# Patient Record
Sex: FEMALE | Race: WHITE | NOT HISPANIC OR LATINO | URBAN - METROPOLITAN AREA
[De-identification: names, ages, dates, MRNs, and addresses within clinical notes are randomized per-mention and may not be internally consistent; named-entity substitution may affect disease eponyms.]

---

## 2023-06-12 ENCOUNTER — OFFICE VISIT (OUTPATIENT)
Dept: URGENT CARE | Facility: CLINIC | Age: 33
End: 2023-06-12
Payer: COMMERCIAL

## 2023-06-12 VITALS
DIASTOLIC BLOOD PRESSURE: 66 MMHG | OXYGEN SATURATION: 96 % | HEART RATE: 97 BPM | RESPIRATION RATE: 17 BRPM | HEIGHT: 62 IN | SYSTOLIC BLOOD PRESSURE: 99 MMHG | TEMPERATURE: 97.6 F | WEIGHT: 160 LBS | BODY MASS INDEX: 29.44 KG/M2

## 2023-06-12 DIAGNOSIS — J02.9 SORE THROAT: Primary | ICD-10-CM

## 2023-06-12 LAB — S PYO AG THROAT QL: NEGATIVE

## 2023-06-12 PROCEDURE — 87070 CULTURE OTHR SPECIMN AEROBIC: CPT

## 2023-06-12 PROCEDURE — 99283 EMERGENCY DEPT VISIT LOW MDM: CPT

## 2023-06-12 PROCEDURE — G0382 LEV 3 HOSP TYPE B ED VISIT: HCPCS

## 2023-06-12 PROCEDURE — 87880 STREP A ASSAY W/OPTIC: CPT

## 2023-06-12 NOTE — PROGRESS NOTES
"  [facility]        NAME: Pankaj Page is a 28 y o  female  : 1990    MRN: [id number]  DATE: 2023  TIME: 9:05 AM    Assessment and Plan   Sore throat [J02 9]  1  Sore throat  POCT rapid strepA    Throat culture            Patient Instructions     Take over the counter cough and cold medicine  Use a warm mist humidifier or vaporizer  Hot tea with honey  Warm saline gargle or throat lozenge may help with a sore throat  OTC saline nasal sprays   Drink plenty of fluids  The rapid strep was negative  A throat culture was sent and will take two days  Follow up with PCP in 3-5 days  Proceed to  ER if symptoms worsen  Chief Complaint     Chief Complaint   Patient presents with   • Sore Throat     Sore throat for 1 week; fevers, body aches, \"worse sore throat ever\"   Slightly getting better, productive cough, congestion, fatigue          History of Present Illness       Sore Throat   This is a new problem  The current episode started in the past 7 days  The problem has been gradually improving  There has been no fever  Associated symptoms include congestion and coughing  Pertinent negatives include no ear pain, headaches, hoarse voice, shortness of breath, stridor or trouble swallowing  Review of Systems   Review of Systems   Constitutional: Negative for activity change, appetite change, chills and fever  HENT: Positive for congestion, postnasal drip and sore throat  Negative for ear pain, hoarse voice, sinus pressure, sinus pain and trouble swallowing  Respiratory: Positive for cough  Negative for shortness of breath and stridor  Neurological: Negative for headaches  All other systems reviewed and are negative          Current Medications       Current Outpatient Medications:   •  Sprintec 28 0 25-35 MG-MCG per tablet, , Disp: , Rfl:     Current Allergies     Allergies as of 2023 - Reviewed 2023   Allergen Reaction Noted   • Sulfa antibiotics Hives " "07/16/2019            The following portions of the patient's history were reviewed and updated as appropriate: allergies, current medications, past family history, past medical history, past social history, past surgical history and problem list      Past Medical History:   Diagnosis Date   • No known health problems        Past Surgical History:   Procedure Laterality Date   • DILATION AND CURETTAGE OF UTERUS         History reviewed  No pertinent family history  Medications have been verified  Objective   BP 99/66   Pulse 97   Temp 97 6 °F (36 4 °C)   Resp 17   Ht 5' 2\" (1 575 m)   Wt 72 6 kg (160 lb)   LMP 05/29/2023   SpO2 96%   BMI 29 26 kg/m²        Physical Exam     Physical Exam  Vitals and nursing note reviewed  Constitutional:       General: She is not in acute distress  Appearance: She is well-developed and normal weight  She is not ill-appearing or toxic-appearing  HENT:      Right Ear: Tympanic membrane normal       Left Ear: Tympanic membrane normal       Mouth/Throat:      Pharynx: Oropharynx is clear  Posterior oropharyngeal erythema present  Cardiovascular:      Rate and Rhythm: Normal rate and regular rhythm  Heart sounds: Normal heart sounds  Pulmonary:      Effort: Pulmonary effort is normal       Breath sounds: Normal breath sounds  Lymphadenopathy:      Cervical: No cervical adenopathy  Skin:     General: Skin is warm  Capillary Refill: Capillary refill takes less than 2 seconds  Neurological:      General: No focal deficit present  Mental Status: She is alert and oriented to person, place, and time                     "

## 2023-06-12 NOTE — PATIENT INSTRUCTIONS
Take over the counter cough and cold medicine  Use a warm mist humidifier or vaporizer  Hot tea with honey  Warm saline gargle or throat lozenge may help with a sore throat  OTC saline nasal sprays   Drink plenty of fluids  The rapid strep was negative  A throat culture was sent and will take two days

## 2023-06-15 LAB — BACTERIA THROAT CULT: NORMAL

## 2023-08-13 ENCOUNTER — HOSPITAL ENCOUNTER (EMERGENCY)
Facility: HOSPITAL | Age: 33
Discharge: HOME | End: 2023-08-13
Attending: EMERGENCY MEDICINE
Payer: COMMERCIAL

## 2023-08-13 ENCOUNTER — TELEPHONE (OUTPATIENT)
Dept: EMERGENCY DEPT | Facility: HOSPITAL | Age: 33
End: 2023-08-13

## 2023-08-13 VITALS
RESPIRATION RATE: 18 BRPM | SYSTOLIC BLOOD PRESSURE: 142 MMHG | OXYGEN SATURATION: 98 % | TEMPERATURE: 97.2 F | HEART RATE: 79 BPM | BODY MASS INDEX: 29.26 KG/M2 | DIASTOLIC BLOOD PRESSURE: 81 MMHG | WEIGHT: 160 LBS

## 2023-08-13 DIAGNOSIS — H00.014 HORDEOLUM EXTERNUM OF LEFT UPPER EYELID: Primary | ICD-10-CM

## 2023-08-13 DIAGNOSIS — H00.014 HORDEOLUM EXTERNUM OF LEFT UPPER EYELID: ICD-10-CM

## 2023-08-13 PROCEDURE — 99284 EMERGENCY DEPT VISIT MOD MDM: CPT | Performed by: EMERGENCY MEDICINE

## 2023-08-13 PROCEDURE — 99282 EMERGENCY DEPT VISIT SF MDM: CPT

## 2023-08-13 RX ORDER — CEPHALEXIN 500 MG/1
500 CAPSULE ORAL EVERY 6 HOURS SCHEDULED
Qty: 40 CAPSULE | Refills: 0 | Status: SHIPPED | OUTPATIENT
Start: 2023-08-13 | End: 2023-08-23

## 2023-08-13 RX ORDER — GENTAMICIN SULFATE 3 MG/ML
2 SOLUTION/ DROPS OPHTHALMIC EVERY 4 HOURS
Qty: 6 ML | Refills: 0 | Status: SHIPPED | OUTPATIENT
Start: 2023-08-13 | End: 2023-08-13 | Stop reason: SDUPTHER

## 2023-08-13 RX ORDER — PREDNISONE 20 MG/1
40 TABLET ORAL ONCE
Status: COMPLETED | OUTPATIENT
Start: 2023-08-13 | End: 2023-08-13

## 2023-08-13 RX ORDER — PREDNISONE 20 MG/1
40 TABLET ORAL DAILY
Qty: 10 TABLET | Refills: 0 | Status: SHIPPED | OUTPATIENT
Start: 2023-08-13 | End: 2023-08-13 | Stop reason: SDUPTHER

## 2023-08-13 RX ORDER — GENTAMICIN SULFATE 3 MG/ML
2 SOLUTION/ DROPS OPHTHALMIC ONCE
Status: COMPLETED | OUTPATIENT
Start: 2023-08-13 | End: 2023-08-13

## 2023-08-13 RX ORDER — PREDNISONE 20 MG/1
40 TABLET ORAL DAILY
Qty: 10 TABLET | Refills: 0 | Status: SHIPPED | OUTPATIENT
Start: 2023-08-13 | End: 2023-08-18

## 2023-08-13 RX ORDER — GENTAMICIN SULFATE 3 MG/ML
2 SOLUTION/ DROPS OPHTHALMIC EVERY 4 HOURS
Qty: 6 ML | Refills: 0 | Status: SHIPPED | OUTPATIENT
Start: 2023-08-13 | End: 2023-08-23

## 2023-08-13 RX ORDER — CEPHALEXIN 500 MG/1
500 CAPSULE ORAL EVERY 6 HOURS SCHEDULED
Qty: 40 CAPSULE | Refills: 0 | Status: SHIPPED | OUTPATIENT
Start: 2023-08-13 | End: 2023-08-13 | Stop reason: SDUPTHER

## 2023-08-13 RX ORDER — CEPHALEXIN 250 MG/1
500 CAPSULE ORAL ONCE
Status: COMPLETED | OUTPATIENT
Start: 2023-08-13 | End: 2023-08-13

## 2023-08-13 RX ADMIN — CEPHALEXIN 500 MG: 250 CAPSULE ORAL at 06:58

## 2023-08-13 RX ADMIN — GENTAMICIN SULFATE 2 DROP: 3 SOLUTION/ DROPS OPHTHALMIC at 06:58

## 2023-08-13 RX ADMIN — PREDNISONE 40 MG: 20 TABLET ORAL at 06:58
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
## 2023-09-15 ENCOUNTER — HOSPITAL ENCOUNTER (OUTPATIENT)
Dept: RADIOLOGY | Facility: CLINIC | Age: 33
End: 2023-09-15
Payer: COMMERCIAL

## 2023-09-15 ENCOUNTER — OFFICE VISIT (OUTPATIENT)
Dept: OBGYN CLINIC | Facility: CLINIC | Age: 33
End: 2023-09-15
Payer: COMMERCIAL

## 2023-09-15 VITALS
DIASTOLIC BLOOD PRESSURE: 80 MMHG | BODY MASS INDEX: 30.44 KG/M2 | TEMPERATURE: 97.7 F | HEIGHT: 62 IN | SYSTOLIC BLOOD PRESSURE: 120 MMHG | WEIGHT: 165.4 LBS | HEART RATE: 88 BPM

## 2023-09-15 DIAGNOSIS — G89.29 CHRONIC PAIN OF RIGHT ANKLE: ICD-10-CM

## 2023-09-15 DIAGNOSIS — M22.2X1 PATELLOFEMORAL DISORDER OF RIGHT KNEE: ICD-10-CM

## 2023-09-15 DIAGNOSIS — M25.561 CHRONIC PAIN OF RIGHT KNEE: Primary | ICD-10-CM

## 2023-09-15 DIAGNOSIS — G89.29 CHRONIC PAIN OF RIGHT KNEE: ICD-10-CM

## 2023-09-15 DIAGNOSIS — M25.571 CHRONIC PAIN OF RIGHT ANKLE: ICD-10-CM

## 2023-09-15 DIAGNOSIS — G89.29 CHRONIC PAIN OF RIGHT KNEE: Primary | ICD-10-CM

## 2023-09-15 DIAGNOSIS — M25.561 CHRONIC PAIN OF RIGHT KNEE: ICD-10-CM

## 2023-09-15 PROCEDURE — 99204 OFFICE O/P NEW MOD 45 MIN: CPT | Performed by: STUDENT IN AN ORGANIZED HEALTH CARE EDUCATION/TRAINING PROGRAM

## 2023-09-15 PROCEDURE — 73562 X-RAY EXAM OF KNEE 3: CPT

## 2023-09-15 PROCEDURE — 73610 X-RAY EXAM OF ANKLE: CPT

## 2023-09-15 NOTE — PROGRESS NOTES
1. Chronic pain of right knee  XR knee 3 vw right non injury    Brace    Ambulatory Referral to Physical Therapy      2. Patellofemoral disorder of right knee  XR knee 3 vw right non injury    Brace    Ambulatory Referral to Physical Therapy      3. Chronic pain of right ankle  XR ankle 3+ vw right    Brace    Ambulatory Referral to Physical Therapy        Orders Placed This Encounter   Procedures   • Brace   • XR ankle 3+ vw right   • XR knee 3 vw right non injury   • Ambulatory Referral to Physical Therapy        Imaging Studies (I personally reviewed images in PACS and report):    • X-ray right knee 9/15/2023: No acute osseous abnormalities. No joint effusion. On sunrise views, as there may be a tiny osteophyte over the lateral patellofemoral joint line. • X-ray right ankle 9/15/2023: No acute osseous abnormalities. No significant degenerative changes. Mortise is intact. IMPRESSION:  • Chronic atraumatic right knee pain-reported history of patellar subluxation in high school- ongoing issue since 2021 after injuring/rolling her ankle. Current clinical history and exam consistent with patellofemoral knee pain syndrome  • Chronic right ankle pain sense of instability since right knee pain has been occurring intermittently. Either secondary to gait dysfunction from patellar knee pain syndrome versus ankle deconditioning from prior ankle ligamentous injuries. • Radiographic imaging unremarkable. Other factors:  • BMI 30    PLAN:    • Clinical exam and radiographic imaging reviewed with patient today, with impression as per above. I have discussed with the patient the pathophysiology of this diagnosis and reviewed how the examination correlates with this diagnosis. • Given chronicity of issues, radiographs of the right knee and right ankle were obtained today as noted above. • Recommended conservative treatment at this time and starting formal physical therapy for at least a minimum of 6 weeks.   • In regards to her right knee pain/crepitus, I have prescribed her a patella stabilizing knee brace to be used during activities that aggravate her knee pain. Also the goal is to transition out of the brace over time. • In regards to her right ankle, I counseled I did not note any sense of instability in her ankle but did offer an ankle brace if needed. Patient declined need for ankle brace, preferring to use Ace wraps instead. • In regards to pain control I counseled as needed use of acetaminophen, NSAIDs, heat/ice therapy 20 minutes on/off. I counseled in some circumstances, an intra-articular cortisone injection can be used to help provide pain relief but this would be a temporary relief of pain without fixing the overall issue of her knee. Patient deferred intra-articular injection at this time. Return if symptoms worsen or fail to improve. Counseled follow-up in 6 to 8 weeks if there is no improvement from physical therapy. I can consider obtaining an MRI of her right knee without contrast if she does not progressively improve with the conservative treatments as per above. Portions of the record may have been created with voice recognition software. Occasional wrong word or "sound a like" substitutions may have occurred due to the inherent limitations of voice recognition software. Read the chart carefully and recognize, using context, where substitutions have occurred. CHIEF COMPLAINT:  Chief Complaint   Patient presents with   • Right Knee - Pain         HPI:  Bo Vizcarra is a 28 y.o. female  who presents for       Visit 9/15/2023:  Initial evaluation of right knee pain:  Of note, patient reports a prior history of a patellar subluxation of her right knee in her teenage years. She reports she was treated conservatively with physical therapy and over time pain progressively improved. In 2021, she had rolled her ankle/sprained her ankle and managed/treated on her own.   However, she noticed that she was starting to experience worsening right anterior knee pain along with clicking while she was recovering from her ankle injury. Since this event, she feels she just has off-and-on pain of her ankle as well as her right knee. She feels her right knee pain has been worsening in the past few weeks. She states pain is around her patellar area and describes it as a sharp/aching pain of moderate intensity. Pain is aggravated from ascending/descending stairs, squatting, prolonged sitting. She reports intermittently feeling some sense of instability of her right ankle as well as if she would give out but this does not happen often. She states the last time she rolled her ankle was several months ago. She has no recent imaging of her right knee or ankle. She does not use a brace for her knee or ankle. In regards to pain control she has used icing, Tylenol, NSAIDs which provides some relief. She is mainly concerned due to the prominent clicking/popping of her knee. Denies sensation of giving out or locking in extension. Medical, Surgical, Family, and Social History    Past Medical History:   Diagnosis Date   • No known health problems      Past Surgical History:   Procedure Laterality Date   • DILATION AND CURETTAGE OF UTERUS       Social History   Social History     Substance and Sexual Activity   Alcohol Use Yes    Comment: rarely     Social History     Substance and Sexual Activity   Drug Use Never     Social History     Tobacco Use   Smoking Status Never   Smokeless Tobacco Never     History reviewed. No pertinent family history.   Allergies   Allergen Reactions   • Sulfa Antibiotics Hives     As a child            Physical Exam  /80   Pulse 88   Temp 97.7 °F (36.5 °C) (Temporal)   Ht 5' 2" (1.575 m)   Wt 75 kg (165 lb 6.4 oz)   BMI 30.25 kg/m²     Constitutional:  see vital signs  Gen: well-developed, normocephalic/atraumatic, well-groomed  Eyes: No inflammation or discharge of conjunctiva or lids; sclera clear   Pulmonary/Chest: Effort normal. No respiratory distress.      Ortho Exam  Right Knee Exam:  Erythema: no  Swelling: no  Increased Warmth: no  Tenderness: +peripatellar joint lines  ROM: 0-130  Knee flexion strength: 5/5  Knee extension strength: 5/5  Patellar Apprehension: negative  Patellar Grind: +  Lachman's: negative  Anterior Drawer: negative  Varus laxity: negative  Valgus laxity: negative  [facility]: negative      Ankle Examination (focused):     Gait: no limp      RIGHT   Inspection Erythema none    Edema none    Ecchymosis none        ROM:  Plantarflexion 50    Dorsiflexion 20        Strength Pronation 5/5    Supination 5/5    Foot plantarflexion 5/5    Foot dorsflexion 5/5        TTP AiTFL no    ATFL no    CFL no    PTFL no    Achilles no    Deltoid no    Peroneal no    Tib Ant no    Tib Post no        TTP (Bony) Prox Fibula no    Lat Malleolus no    Base of 5th MT no    Med Malleolus no    Navicular no    Talar Dome no        Anterior Drawer ATFL negative   Calcaneal Squeeze  negative   Tib-Fib Squeeze Test  negative   Talar Tilt (stab tib,DF foot,invert foot) CFL negative   ER Stress (stab tib,ER foot) High ankle negative   Eversion stress (stab tib, ele foot) deltoid negative   MT Compression  negative         No calf tenderness to palpation     LE NV Exam: +2 DP/PT pulses   Sensation intact to light touch            Procedures